# Patient Record
Sex: MALE | Race: WHITE | NOT HISPANIC OR LATINO | Employment: OTHER | URBAN - NONMETROPOLITAN AREA
[De-identification: names, ages, dates, MRNs, and addresses within clinical notes are randomized per-mention and may not be internally consistent; named-entity substitution may affect disease eponyms.]

---

## 2021-08-17 ENCOUNTER — OFFICE VISIT (OUTPATIENT)
Dept: INTERNAL MEDICINE | Facility: CLINIC | Age: 76
End: 2021-08-17

## 2021-08-17 VITALS
BODY MASS INDEX: 24.44 KG/M2 | WEIGHT: 165 LBS | HEART RATE: 85 BPM | TEMPERATURE: 100 F | HEIGHT: 69 IN | SYSTOLIC BLOOD PRESSURE: 120 MMHG | DIASTOLIC BLOOD PRESSURE: 70 MMHG | OXYGEN SATURATION: 97 %

## 2021-08-17 DIAGNOSIS — R06.02 SHORTNESS OF BREATH: ICD-10-CM

## 2021-08-17 DIAGNOSIS — R50.9 FEVER, UNSPECIFIED FEVER CAUSE: Primary | ICD-10-CM

## 2021-08-17 LAB — SARS-COV-2 ORF1AB RESP QL NAA+PROBE: DETECTED

## 2021-08-17 PROCEDURE — 99203 OFFICE O/P NEW LOW 30 MIN: CPT | Performed by: NURSE PRACTITIONER

## 2021-08-17 PROCEDURE — U0005 INFEC AGEN DETEC AMPLI PROBE: HCPCS | Performed by: NURSE PRACTITIONER

## 2021-08-17 PROCEDURE — U0004 COV-19 TEST NON-CDC HGH THRU: HCPCS | Performed by: NURSE PRACTITIONER

## 2021-08-17 RX ORDER — DIVALPROEX SODIUM 500 MG/1
500 TABLET, DELAYED RELEASE ORAL 2 TIMES DAILY
COMMUNITY

## 2021-08-17 RX ORDER — AZITHROMYCIN 250 MG/1
TABLET, FILM COATED ORAL
Qty: 6 TABLET | Refills: 0 | Status: SHIPPED | OUTPATIENT
Start: 2021-08-17

## 2021-08-17 RX ORDER — TAMSULOSIN HYDROCHLORIDE 0.4 MG/1
1 CAPSULE ORAL DAILY
COMMUNITY

## 2021-08-17 NOTE — PROGRESS NOTES
"        Subjective     Chief Complaint   Patient presents with   • Cough   • Generalized Body Aches   • Nasal Congestion       History of Present Illness  Pt comes in today with complaints of cough, aches, and nasal congestion. Temp today was 100. He is vaccinated for COVID. Onset of symptoms is yesterday. No loss of taste or smell.  He has been taking antihistamine. States his eyes are burning this am. He is nervous because of history of radiation form lung cancer.     Review of Systems   Denies any chest pain.  Admits to anxiety about possible diagnosis  Otherwise complete ROS reviewed and negative except as mentioned in the HPI.    Past Medical History:   Past Medical History:   Diagnosis Date   • Cancer (CMS/HCC)     Bladder & Lung     Past Surgical History:  Past Surgical History:   Procedure Laterality Date   • BLADDER SURGERY      Bladder Cancer   • BRAIN SURGERY     • HERNIA REPAIR       Social History:  reports that he has never smoked. He has never used smokeless tobacco. He reports that he does not drink alcohol and does not use drugs.    Family History: family history includes No Known Problems in his brother.      Allergies:  Allergies   Allergen Reactions   • Codeine Hives   • Sulfa Antibiotics Other (See Comments)     Caused Kidneys to crystalize.     Medications:  Prior to Admission medications    Medication Sig Start Date End Date Taking? Authorizing Provider   divalproex (DEPAKOTE) 500 MG DR tablet Take 500 mg by mouth 2 (Two) Times a Day.   Yes ProviderChio MD   tamsulosin (FLOMAX) 0.4 MG capsule 24 hr capsule Take 1 capsule by mouth Daily.   Yes ProviderChio MD       Objective     Vital Signs: /70   Pulse 85   Temp 100 °F (37.8 °C)   Ht 175.3 cm (69\")   Wt 74.8 kg (165 lb)   SpO2 97%   BMI 24.37 kg/m²   Physical Exam  Vitals reviewed.   Constitutional:       Appearance: Normal appearance. He is well-developed. He is ill-appearing.   HENT:      Head: Normocephalic and " atraumatic.      Right Ear: Tympanic membrane normal.      Left Ear: Tympanic membrane normal.      Mouth/Throat:      Pharynx: Posterior oropharyngeal erythema present.   Eyes:      Extraocular Movements: Extraocular movements intact.      Pupils: Pupils are equal, round, and reactive to light.   Neck:      Vascular: No JVD.   Cardiovascular:      Rate and Rhythm: Normal rate and regular rhythm.      Pulses: Normal pulses.   Pulmonary:      Comments: Diminished throughout.   Abdominal:      General: Bowel sounds are normal.      Palpations: Abdomen is soft.   Musculoskeletal:         General: No deformity.      Cervical back: Normal range of motion and neck supple.   Lymphadenopathy:      Cervical: No cervical adenopathy.   Skin:     General: Skin is warm and dry.   Neurological:      Mental Status: He is alert and oriented to person, place, and time.   Psychiatric:         Behavior: Behavior normal.         Thought Content: Thought content normal.         Judgment: Judgment normal.       Results Reviewed:  No results found for: GLUCOSE, BUN, CREATININE, NA, K, CL, CO2, CALCIUM, ALT, AST, WBC, HCT, PLT, CHOL, TRIG, HDL, LDL, LDLHDL, HGBA1C      Assessment / Plan     Assessment/Plan:  Diagnoses and all orders for this visit:    1. Fever, unspecified fever cause (Primary)  -     XR Chest PA & Lateral (In Office)  -     COVID-19,APTIMA PANTHER,PAD IN-HOUSE,NP/OP/NASAL SWAB IN UTM/VTM/SALINE/LIQUID AMIES TRANSPORT MEDIA/NP WASH OR ASPIRATE, 24 HR TAT - Swab, Nasal Cavity  -     azithromycin (Zithromax Z-Jonas) 250 MG tablet; Take 2 tablets the first day, then 1 tablet daily for 4 days.  Dispense: 6 tablet; Refill: 0    2. Shortness of breath  -     COVID-19,APTIMA PANTHER,PAD IN-HOUSE,NP/OP/NASAL SWAB IN UTM/VTM/SALINE/LIQUID AMIES TRANSPORT MEDIA/NP WASH OR ASPIRATE, 24 HR TAT - Swab, Nasal Cavity      Instructed him to quarantine until Covid results are known.   Encouraged fluids.   CXR shows scarring mid lung.     No  follow-ups on file. unless patient needs to be seen sooner or acute issues arise.    I have discussed the patient results/orders and and plan/recommendation with them at today's visit.      Clemencia Goldberg, APRN   08/17/2021

## 2021-08-18 ENCOUNTER — TELEPHONE (OUTPATIENT)
Dept: INTERNAL MEDICINE | Facility: CLINIC | Age: 76
End: 2021-08-18

## 2021-08-18 NOTE — TELEPHONE ENCOUNTER
Called pt and let him know that as soon as [facility] gets everything they need they will call the pt and get him scheduled. Pt voiced understanding.

## 2021-08-18 NOTE — TELEPHONE ENCOUNTER
Returned call to patient to discuss symptoms. He states that his symptoms are not severe, but are overall a little worse. He is concerned and doesn't want to wait thinking he's okay and then end up in the hospital. He mentioned that he had spoken to ELSY Simon last night and they discussed the potential of an infusion for him. He is inquiring about the status of that. I advised that I would consult with Sherry and return a call to him. We also discussed having a family member or friend bring him a pulse oximeter to sara his O2 saturation while at home. He states his fever is controlled with tylenol but he is taking it regularly. I advised if his symptoms were worsening to the point of concern with getting a good breath or chest pain and uncontrolled fever, he should go to the ER. He voiced understanding. I also stated I would consult with a provider in the office today to see if any further guidance is needed and would return a call to him.

## 2021-08-18 NOTE — TELEPHONE ENCOUNTER
Called pt and he states he is going to get infusion today at 12 o'clock at [facility]

## 2021-08-18 NOTE — TELEPHONE ENCOUNTER
Attempted to call pt to discuss plan of care recommenede by Clemencia CHIN. No answer. Left Vm to return call.

## 2021-08-18 NOTE — TELEPHONE ENCOUNTER
Caller: Jarrell Jacobo    Relationship to patient: Self    Best call back number:  [phone]      Patient is needing:  Pt said that his covid symptoms seem to be worsening since yesterday. He is experiencing mild shortness of air with exertion. Worsening cough + still has fever and body aches.     He is asking what the course of treatment will be moving forward.         [facility] [address] [place] [address] [phone] [facility] [phone] FX

## 2021-08-18 NOTE — TELEPHONE ENCOUNTER
I've spoken to [facility]. He is 7th in line. Nicki is faxing the PCR result to [facility] now. [facility] will call the patient. If he has problems the hospitalist will take over.

## 2021-08-18 NOTE — TELEPHONE ENCOUNTER
Called pt and let him know that Clemencia Yusuf CHIN is wanting him to get the Regeneron covid infusion at [facility]. Let him know that shes calling them now and as soon as she calls me back ill let him know something. Pt voiced understanding and said that he wants the infusion and wanted to go somewhere else if [facility] wasn't available.

## 2021-08-19 ENCOUNTER — TELEPHONE (OUTPATIENT)
Dept: INTERNAL MEDICINE | Facility: CLINIC | Age: 76
End: 2021-08-19

## 2021-08-19 NOTE — TELEPHONE ENCOUNTER
Called pt to check on him and he states he is feeling much better. Says symptoms severity has decreased. Told him to call us if he needs anything.

## 2021-08-20 ENCOUNTER — PATIENT ROUNDING (BHMG ONLY) (OUTPATIENT)
Dept: INTERNAL MEDICINE | Facility: CLINIC | Age: 76
End: 2021-08-20

## 2021-08-20 NOTE — PROGRESS NOTES
August 20, 2021    Hello, may I speak with Jarrell Jacobo?    My name is Holly Perrin     I am  with [facility]  [address] [place] [address] [phone].    Before we get started may I verify your date of birth? 1945    I am calling to officially welcome you to our practice and ask about your recent visit. Is this a good time to talk? yes    Tell me about your visit with us. What things went well?  Everything went well - Sherry took good care of me.        We're always looking for ways to make our patients' experiences even better. Do you have recommendations on ways we may improve?  no    Overall were you satisfied with your first visit to our practice? yes       I appreciate you taking the time to speak with me today. Is there anything else I can do for you? Yes - the health department told me to quarantine for 14 days and the nurse at [facility] told me to quarantine for 10 days. Can you tell me which I should listen to? I told him to listen to the health department recommendation. He also asked if after his quarantine period should he come in for a test to make sure his covid was gone. I told him to follow the recommendations from the health department and that after that he should no longer be contagious. I explained to him that you can test positive for covid19 up to 3 months after originally testing positive. Patient understood. .       Thank you, and have a great day.

## 2021-08-25 ENCOUNTER — TELEPHONE (OUTPATIENT)
Dept: INTERNAL MEDICINE | Facility: CLINIC | Age: 76
End: 2021-08-25

## 2021-08-25 NOTE — TELEPHONE ENCOUNTER
Caller: Pantera Jacobo    Relationship: Self    Best call back number: [phone]     What form or medical record are you requesting: COPY OF OFFICE VISIT NOTES AND F/U PROCEDURE FROM 08/17/2021    Who is requesting this form or medical record from you: PANTERA JACOBO    How would you like to receive the form or medical records (pick-up, mail, fax): MAIL    If mail, what is the address: [address] [place] [id number]    Timeframe paperwork needed: ASAP

## 2021-08-27 NOTE — TELEPHONE ENCOUNTER
Called and spoke to patient to discuss questions about copies of his records. I informed him that he is welcome to come to the office at any time to pick them up. He voiced understanding and had no further questions.

## 2021-10-08 ENCOUNTER — OFFICE VISIT (OUTPATIENT)
Dept: INTERNAL MEDICINE | Facility: CLINIC | Age: 76
End: 2021-10-08

## 2021-10-08 VITALS
OXYGEN SATURATION: 99 % | DIASTOLIC BLOOD PRESSURE: 78 MMHG | SYSTOLIC BLOOD PRESSURE: 120 MMHG | TEMPERATURE: 98.2 F | HEIGHT: 69 IN | RESPIRATION RATE: 16 BRPM | WEIGHT: 168 LBS | BODY MASS INDEX: 24.88 KG/M2 | HEART RATE: 76 BPM

## 2021-10-08 DIAGNOSIS — R39.89 BLADDER PAIN: Primary | ICD-10-CM

## 2021-10-08 DIAGNOSIS — R19.7 DIARRHEA, UNSPECIFIED TYPE: ICD-10-CM

## 2021-10-08 LAB
BILIRUB BLD-MCNC: NEGATIVE MG/DL
CLARITY, POC: CLEAR
COLOR UR: YELLOW
EXPIRATION DATE: ABNORMAL
GLUCOSE UR STRIP-MCNC: NEGATIVE MG/DL
KETONES UR QL: NEGATIVE
LEUKOCYTE EST, POC: ABNORMAL
Lab: ABNORMAL
NITRITE UR-MCNC: NEGATIVE MG/ML
PH UR: 7 [PH] (ref 5–8)
PROT UR STRIP-MCNC: NEGATIVE MG/DL
RBC # UR STRIP: ABNORMAL /UL
SP GR UR: 1.03 (ref 1–1.03)
UROBILINOGEN UR QL: NORMAL

## 2021-10-08 PROCEDURE — 99213 OFFICE O/P EST LOW 20 MIN: CPT | Performed by: NURSE PRACTITIONER

## 2021-10-08 PROCEDURE — 81003 URINALYSIS AUTO W/O SCOPE: CPT | Performed by: NURSE PRACTITIONER

## 2021-10-08 RX ORDER — ACETAMINOPHEN 500 MG
500 TABLET ORAL
COMMUNITY

## 2021-10-08 RX ORDER — CIPROFLOXACIN 500 MG/1
500 TABLET, FILM COATED ORAL 2 TIMES DAILY
Qty: 10 TABLET | Refills: 0 | Status: SHIPPED | OUTPATIENT
Start: 2021-10-08

## 2021-10-08 NOTE — PROGRESS NOTES
Subjective     Chief Complaint   Patient presents with   • Difficulty Urinating     different from normal    • colon pain       History of Present Illness  Pt comes in today with complaints of urinary pain. He carries a history of bladder cancer with partial removal of bladder. He has had pain in his bladder since surgery 5 years ago. His remedy is to self cath. He usually self caths 4-5 times per day. States he has noticed over the past 3 days, the catheter is causing more pain and now he is having pain even post cath. In addition, he is having loose stools with a negative colonoscopy about a month ago. Now, he is leaking mucous when he has the bladder spasms. No fevers. No flank pain. No blood with cathing.     Review of Systems   Otherwise complete ROS.    Past Medical History:   Past Medical History:   Diagnosis Date   • Cancer (HCC)     Bladder & Lung     Past Surgical History:  Past Surgical History:   Procedure Laterality Date   • BLADDER SURGERY      Bladder Cancer   • BRAIN SURGERY     • HERNIA REPAIR       Social History:  reports that he has never smoked. He has never used smokeless tobacco. He reports that he does not drink alcohol and does not use drugs.    Family History: family history includes No Known Problems in his brother.       Allergies:  Allergies   Allergen Reactions   • Codeine Hives   • Sulfa Antibiotics Other (See Comments)     Caused Kidneys to crystalize.     Medications:  Prior to Admission medications    Medication Sig Start Date End Date Taking? Authorizing Provider   acetaminophen (TYLENOL) 500 MG tablet Take 500 mg by mouth.   Yes Chio Larios MD   divalproex (DEPAKOTE) 500 MG DR tablet Take 500 mg by mouth 2 (Two) Times a Day.   Yes Chio Larios MD   tamsulosin (FLOMAX) 0.4 MG capsule 24 hr capsule Take 1 capsule by mouth Daily.   Yes Chio Larios MD   azithromycin (Zithromax Z-Jonas) 250 MG tablet Take 2 tablets the first day, then 1 tablet daily  "for 4 days. 8/17/21   Yusuf Clemencia FuentesELSY maza       Objective     Vital Signs: /78 (BP Location: Right arm, Patient Position: Sitting, Cuff Size: Adult)   Pulse 76   Temp 98.2 °F (36.8 °C) (Infrared)   Resp 16   Ht 175.3 cm (69.02\")   Wt 76.2 kg (168 lb)   SpO2 99%   BMI 24.80 kg/m²   Physical Exam  Vitals reviewed.   Constitutional:       Appearance: He is well-developed.   HENT:      Head: Normocephalic and atraumatic.   Eyes:      Pupils: Pupils are equal, round, and reactive to light.   Neck:      Vascular: No JVD.   Cardiovascular:      Rate and Rhythm: Normal rate and regular rhythm.   Pulmonary:      Effort: Pulmonary effort is normal.   Abdominal:      General: Bowel sounds are normal.      Palpations: Abdomen is soft.      Tenderness: There is no abdominal tenderness.   Musculoskeletal:         General: No deformity.      Cervical back: Normal range of motion and neck supple.   Lymphadenopathy:      Cervical: No cervical adenopathy.   Skin:     General: Skin is warm and dry.   Neurological:      Mental Status: He is alert and oriented to person, place, and time.   Psychiatric:         Behavior: Behavior normal.         Thought Content: Thought content normal.         Judgment: Judgment normal.       Results Reviewed:  Creatinine   Date Value Ref Range Status   07/19/2021 1.1 0.5 - 1.5 mg/dL Final     Triglycerides   Date Value Ref Range Status   04/27/2021 85 <=149 mg/dL Final     HDL Cholesterol   Date Value Ref Range Status   04/27/2021 83 >=40 mg/dL Final     Comment:     Levels of HDL cholesterol above 60 mg/dL are considered a negative risk factor for coronary heart disease.   Source: NCEP ATP III Expert report, LELAND 2001; 285(19):2486-97; Circulation, 2002;106:3143.     LDL Cholesterol    Date Value Ref Range Status   04/27/2021 126 1 - 129 mg/dL Final     Comment:     ATP III Classification of LDL Cholesterol          Optimal        (<100)      mg/dL          Near Optimal   " (100-129)   mg/dL          Borderline High(130-159)   mg/dL          High           (160-189)   mg/dL          Very High      (>189)      mg/dL         Assessment / Plan     Assessment/Plan:  Diagnoses and all orders for this visit:    1. Bladder pain (Primary)  -     POCT urinalysis dipstick, automated  -     Urine Culture - Urine, Urine, Catheter In/Out    2. Diarrhea, unspecified type  -     Gastrointestinal Panel, PCR - Stool, Per Rectum; Future    Other orders  -     ciprofloxacin (Cipro) 500 MG tablet; Take 1 tablet by mouth 2 (Two) Times a Day.  Dispense: 10 tablet; Refill: 0      UA here shows moderate leukocytes and trace of blood. Will send for culture and pt to call back Monday with update. If not better, will need CT. He has a CT of the chest scheduled on the 18th at [facility]. If he does not improve, it may be prudent to have the CT done at [facility].       No follow-ups on file. unless patient needs to be seen sooner or acute issues arise.    I have discussed the patient results/orders and and plan/recommendation with them at today's visit.      Clemencia Goldberg, APRN   10/08/2021

## 2021-10-13 ENCOUNTER — TELEPHONE (OUTPATIENT)
Dept: INTERNAL MEDICINE | Facility: CLINIC | Age: 76
End: 2021-10-13

## 2021-10-13 LAB
ADV 40+41 DNA STL QL NAA+NON-PROBE: NOT DETECTED
ASTRO TYP 1-8 RNA STL QL NAA+NON-PROBE: NOT DETECTED
BACTERIA UR CULT: ABNORMAL
BACTERIA UR CULT: ABNORMAL
C CAYETANENSIS DNA STL QL NAA+NON-PROBE: NOT DETECTED
C COLI+JEJ+UPSA DNA STL QL NAA+NON-PROBE: NOT DETECTED
C DIF TOX TCDA+TCDB STL QL NAA+NON-PROBE: NOT DETECTED
CRYPTOSP DNA STL QL NAA+NON-PROBE: NOT DETECTED
E COLI O157 DNA STL QL NAA+NON-PROBE: NORMAL
E HISTOLYT DNA STL QL NAA+NON-PROBE: NOT DETECTED
EAEC PAA PLAS AGGR+AATA ST NAA+NON-PRB: NOT DETECTED
EC STX1+STX2 GENES STL QL NAA+NON-PROBE: NOT DETECTED
EPEC EAE GENE STL QL NAA+NON-PROBE: NOT DETECTED
ETEC LTA+ST1A+ST1B TOX ST NAA+NON-PROBE: NOT DETECTED
G LAMBLIA DNA STL QL NAA+NON-PROBE: NOT DETECTED
NOROVIRUS GI+II RNA STL QL NAA+NON-PROBE: NOT DETECTED
OTHER ANTIBIOTIC SUSC ISLT: ABNORMAL
P SHIGELLOIDES DNA STL QL NAA+NON-PROBE: NOT DETECTED
RVA RNA STL QL NAA+NON-PROBE: NOT DETECTED
S ENT+BONG DNA STL QL NAA+NON-PROBE: NOT DETECTED
SAPO I+II+IV+V RNA STL QL NAA+NON-PROBE: NOT DETECTED
SHIGELLA SP+EIEC IPAH ST NAA+NON-PROBE: NOT DETECTED
V CHOL+PARA+VUL DNA STL QL NAA+NON-PROBE: NOT DETECTED
V CHOLERAE DNA STL QL NAA+NON-PROBE: NOT DETECTED
Y ENTEROCOL DNA STL QL NAA+NON-PROBE: NOT DETECTED

## 2021-10-13 NOTE — TELEPHONE ENCOUNTER
"Called pt with results of labs. Pt voiced understanding. Pt states he is feeling much better and \"back to normal.\"   "

## 2021-11-30 ENCOUNTER — CLINICAL SUPPORT (OUTPATIENT)
Dept: INTERNAL MEDICINE | Facility: CLINIC | Age: 76
End: 2021-11-30

## 2021-11-30 DIAGNOSIS — Z11.52 ENCOUNTER FOR SCREENING FOR COVID-19: Primary | ICD-10-CM

## 2021-11-30 LAB
SARS-COV-2 ORF1AB RESP QL NAA+PROBE: NOT DETECTED
SARS-COV-2 RNA PNL SPEC NAA+PROBE: NOT DETECTED

## 2021-11-30 PROCEDURE — 87635 SARS-COV-2 COVID-19 AMP PRB: CPT | Performed by: NURSE PRACTITIONER

## 2021-11-30 PROCEDURE — U0005 INFEC AGEN DETEC AMPLI PROBE: HCPCS | Performed by: NURSE PRACTITIONER

## 2021-11-30 PROCEDURE — U0004 COV-19 TEST NON-CDC HGH THRU: HCPCS | Performed by: NURSE PRACTITIONER

## 2021-11-30 PROCEDURE — 99211 OFF/OP EST MAY X REQ PHY/QHP: CPT | Performed by: NURSE PRACTITIONER

## 2021-11-30 NOTE — PROGRESS NOTES
Jarrell Jacobo  1945  [id number]    Verified patient's NAME and  before test was performed.     COVID-19 Test Performed:   Nasopharyngeal Swab     Location:  PATIENT VEHICLE     How did the patient tolerate the test?   Well tolerated by patient..    Staff Member Performing Test:  Shira Cheung Ma    PPE Worn:    mask, gloves, eye protection, face shield, gown and respirator

## 2022-07-01 ENCOUNTER — TELEPHONE (OUTPATIENT)
Dept: INTERNAL MEDICINE | Facility: CLINIC | Age: 77
End: 2022-07-01

## 2022-07-01 NOTE — TELEPHONE ENCOUNTER
Caller: Jarrell Jacobo     Relationship: [unfilled]     Best call back number: [phone]    What is your medical concern?  THE PATIENT STATES THAT HE FEELS LIKE HE HAS ACID REFLUX AND THAT SOMETHING IS IRRITATING IS ESOPHAGUS. THE PATIENT ALSO STATES THAT HE HAS TAKEN TUMS, OMEPRAZOLE 20 MG, AND PEPTO- BISMOL AND IS STILL EXPERIENCING SYMPTOMS. THE PATIENT STATES THAT HE WOULD LIKE TO KNOW WHAT TO DO TO FIND RELIEF.  PLEASE ADVISE    How long has this issue been going on?  12 HOURS    Is your provider already aware of this issue? NO    Have you been treated for this issue? NO

## 2022-07-01 NOTE — TELEPHONE ENCOUNTER
I have called patient and he has not taken the omeprazole in 7 months. Patient started back on the omeprazole this morning and will continue to take it along with the Gaviscon. If patient's symptoms are not any better on Tuesday, he will the office and schedule an appt.

## 2023-05-04 ENCOUNTER — OFFICE VISIT (OUTPATIENT)
Dept: INTERNAL MEDICINE | Facility: CLINIC | Age: 78
End: 2023-05-04
Payer: MEDICARE

## 2023-05-04 ENCOUNTER — TELEPHONE (OUTPATIENT)
Dept: INTERNAL MEDICINE | Facility: CLINIC | Age: 78
End: 2023-05-04

## 2023-05-04 VITALS
RESPIRATION RATE: 18 BRPM | WEIGHT: 172.1 LBS | TEMPERATURE: 98 F | OXYGEN SATURATION: 100 % | BODY MASS INDEX: 24.64 KG/M2 | HEIGHT: 70 IN | HEART RATE: 77 BPM

## 2023-05-04 DIAGNOSIS — R11.0 NAUSEA: Primary | ICD-10-CM

## 2023-05-04 DIAGNOSIS — J01.10 ACUTE NON-RECURRENT FRONTAL SINUSITIS: ICD-10-CM

## 2023-05-04 DIAGNOSIS — R05.1 ACUTE COUGH: Primary | ICD-10-CM

## 2023-05-04 LAB
EXPIRATION DATE: NORMAL
FLUAV AG UPPER RESP QL IA.RAPID: NOT DETECTED
FLUBV AG UPPER RESP QL IA.RAPID: NOT DETECTED
INTERNAL CONTROL: NORMAL
Lab: NORMAL
SARS-COV-2 AG UPPER RESP QL IA.RAPID: NOT DETECTED

## 2023-05-04 RX ORDER — AMOXICILLIN AND CLAVULANATE POTASSIUM 500; 125 MG/1; MG/1
1 TABLET, FILM COATED ORAL 2 TIMES DAILY
Qty: 14 TABLET | Refills: 0 | Status: SHIPPED | OUTPATIENT
Start: 2023-05-04 | End: 2023-05-05

## 2023-05-04 RX ORDER — METHYLPREDNISOLONE 4 MG/1
TABLET ORAL
Qty: 21 TABLET | Refills: 0 | Status: SHIPPED | OUTPATIENT
Start: 2023-05-04

## 2023-05-04 RX ORDER — TADALAFIL 10 MG/1
10 TABLET ORAL DAILY PRN
COMMUNITY
Start: 2023-04-29 | End: 2023-05-04

## 2023-05-04 RX ORDER — ONDANSETRON 4 MG/1
4 TABLET, ORALLY DISINTEGRATING ORAL EVERY 8 HOURS PRN
Qty: 30 TABLET | Refills: 0 | Status: SHIPPED | OUTPATIENT
Start: 2023-05-04

## 2023-05-04 RX ORDER — CLOTRIMAZOLE AND BETAMETHASONE DIPROPIONATE 10; .64 MG/G; MG/G
CREAM TOPICAL
COMMUNITY
Start: 2023-03-15 | End: 2023-05-04

## 2023-05-04 NOTE — PROGRESS NOTES
Subjective     Chief Complaint   Patient presents with   • Cough     Symptoms started 3 days ago.    • Nasal Congestion   • Generalized Body Aches       History of Present Illness  Patient presents today with complaints of cough, nasal congestion, and body aches for the last 3 days. Initially thought was allergies, but felt worse yesterday. Admits he is a little better today. Has been taking OTC mucinex. Tested of COVID at home yesterday, was negative. Cough is productive at times, congestion has loosened some.   Patient's PMR from outside medical facility reviewed and noted.    Review of Systems   Constitutional: Positive for fatigue. Negative for activity change, fever and unexpected weight change.   HENT: Positive for congestion, ear pain, postnasal drip, rhinorrhea and sneezing. Negative for mouth sores and trouble swallowing.    Eyes: Negative for discharge and visual disturbance.   Respiratory: Positive for cough. Negative for shortness of breath and wheezing.    Cardiovascular: Negative for chest pain and leg swelling.   Gastrointestinal: Negative for abdominal pain, constipation, diarrhea and nausea.   Genitourinary: Negative for decreased urine volume, difficulty urinating and hematuria.   Musculoskeletal: Positive for myalgias. Negative for back pain and gait problem.   Skin: Negative for color change and rash.   Allergic/Immunologic: Negative for environmental allergies and immunocompromised state.   Neurological: Positive for headaches. Negative for weakness.   Psychiatric/Behavioral: Negative for confusion and sleep disturbance.        Otherwise complete ROS reviewed and negative except as mentioned in the HPI.    Past Medical History:   Past Medical History:   Diagnosis Date   • Cancer     Bladder & Lung     Past Surgical History:  Past Surgical History:   Procedure Laterality Date   • BLADDER SURGERY      Bladder Cancer   • BRAIN SURGERY     • HERNIA REPAIR       Social History:  reports that  "he has never smoked. He has never used smokeless tobacco. He reports that he does not drink alcohol and does not use drugs.    Family History: family history includes Heart disease in his father; No Known Problems in his brother.      Allergies:  Allergies   Allergen Reactions   • Codeine Hives   • Sulfa Antibiotics Other (See Comments)     Caused Kidneys to crystalize.     Medications:  Prior to Admission medications    Medication Sig Start Date End Date Taking? Authorizing Provider   azithromycin (Zithromax Z-Jonas) 250 MG tablet Take 2 tablets the first day, then 1 tablet daily for 4 days. 8/17/21 5/4/23 Yes Clemencia Goldberg APRN   acetaminophen (TYLENOL) 500 MG tablet Take 1 tablet by mouth.    ProviderChio MD   ciprofloxacin (Cipro) 500 MG tablet Take 1 tablet by mouth 2 (Two) Times a Day. 10/8/21   Clemencia Goldberg APRN   clotrimazole-betamethasone (LOTRISONE) 1-0.05 % cream APPLY TOPICALLY 2 TIMES DAILY.  Patient not taking: Reported on 5/4/2023 3/15/23   Chio Larios MD   tadalafil (CIALIS) 10 MG tablet Take 1 tablet by mouth Daily As Needed.  Patient not taking: Reported on 5/4/2023 4/29/23   Chio Larios MD   divalproex (DEPAKOTE) 500 MG DR tablet Take 500 mg by mouth 2 (Two) Times a Day.  5/4/23  Chio Larios MD   tamsulosin (FLOMAX) 0.4 MG capsule 24 hr capsule Take 1 capsule by mouth Daily.  5/4/23  Chio Larios MD       Objective     Vital Signs: Pulse 77   Temp 98 °F (36.7 °C) (Skin)   Resp 18   Ht 177.8 cm (70\")   Wt 78.1 kg (172 lb 1.6 oz)   SpO2 100%   BMI 24.69 kg/m²   Physical Exam  Vitals and nursing note reviewed.   Constitutional:       General: He is not in acute distress.     Appearance: Normal appearance. He is not ill-appearing.   HENT:      Head: Normocephalic and atraumatic.      Right Ear: External ear normal.      Left Ear: External ear normal.      Ears:      Comments: BL TM bulging       Nose: Congestion and rhinorrhea " present.      Mouth/Throat:      Mouth: Mucous membranes are moist.      Pharynx: No posterior oropharyngeal erythema.   Eyes:      General: No scleral icterus.     Extraocular Movements: Extraocular movements intact.      Conjunctiva/sclera: Conjunctivae normal.      Pupils: Pupils are equal, round, and reactive to light.   Cardiovascular:      Rate and Rhythm: Normal rate and regular rhythm.      Pulses: Normal pulses.      Heart sounds: Normal heart sounds.   Pulmonary:      Effort: Pulmonary effort is normal. No respiratory distress.      Breath sounds: Normal breath sounds. No wheezing.   Abdominal:      General: Abdomen is flat. Bowel sounds are normal.      Palpations: Abdomen is soft.      Tenderness: There is no abdominal tenderness.   Musculoskeletal:         General: Normal range of motion.      Cervical back: Normal range of motion.      Right lower leg: No edema.      Left lower leg: No edema.   Skin:     General: Skin is warm and dry.      Capillary Refill: Capillary refill takes less than 2 seconds.      Findings: No erythema or rash.   Neurological:      General: No focal deficit present.      Mental Status: He is alert and oriented to person, place, and time. Mental status is at baseline.      Motor: No weakness.   Psychiatric:         Mood and Affect: Mood normal.         Behavior: Behavior normal.         Thought Content: Thought content normal.         Judgment: Judgment normal.         BMI is within normal parameters. No other follow-up for BMI required.    Results Reviewed:  BUN   Date Value Ref Range Status   05/03/2022 18 8 - 26 mg/dL Final     Creatinine   Date Value Ref Range Status   05/03/2022 0.94 0.72 - 1.25 mg/dL Final   07/19/2021 1.1 0.5 - 1.5 mg/dL Final     Sodium   Date Value Ref Range Status   05/03/2022 141 136 - 145 mmol/L Final     Potassium   Date Value Ref Range Status   05/03/2022 4.3 3.3 - 4.8 mmol/L Final     Comment:     Plasma reference ranges are shown, please note  that serum reference ranges are higher than plasma.     Chloride   Date Value Ref Range Status   05/03/2022 106 98 - 107 mmol/L Final     Total CO2   Date Value Ref Range Status   05/03/2022 27 23 - 31 mmol/L Final     Calcium   Date Value Ref Range Status   05/03/2022 8.9 8.4 - 10.5 mg/dL Final     Triglycerides   Date Value Ref Range Status   03/14/2023 74 <=149 mg/dL Final     HDL Cholesterol   Date Value Ref Range Status   03/14/2023 69 >=40 mg/dL Final     Comment:     Levels of HDL cholesterol above 60 mg/dL are considered a negative risk factor for coronary heart disease.   Source: NCEP ATP III Expert report, LELAND 2001; 285(19):2486-97; Circulation, 2002;106:3143.     LDL Cholesterol    Date Value Ref Range Status   03/14/2023 134 (H) 1 - 129 mg/dL Final     Comment:     ATP III Classification of LDL Cholesterol          Optimal        (<100)      mg/dL          Near Optimal   (100-129)   mg/dL          Borderline High(130-159)   mg/dL          High           (160-189)   mg/dL          Very High      (>189)      mg/dL         Assessment / Plan     Assessment/Plan:  1. Acute cough  - POCT SARS-CoV-2 Antigen REHAN + Flu    2. Acute non-recurrent frontal sinusitis  - amoxicillin-clavulanate (Augmentin) 500-125 MG per tablet; Take 1 tablet by mouth 2 (Two) Times a Day for 7 days.  Dispense: 14 tablet; Refill: 0  - methylPREDNISolone (MEDROL) 4 MG dose pack; Take as directed on package instructions.  Dispense: 21 tablet; Refill: 0    Negative covid/flu. Discussed viral testing, [place]     Return if symptoms worsen or fail to improve. unless patient needs to be seen sooner or acute issues arise.      I have discussed the patient results/orders and and plan/recommendation with them at today's visit.      Ladonna Sheffield, APRN   05/04/2023

## 2023-05-04 NOTE — TELEPHONE ENCOUNTER
Caller: Jarrell Jacobo    Relationship: Self    Best call back number: [phone]    What medication are you requesting:  SOMETHING FOR NAUSEA     What are your current symptoms  NAUSEA     How long have you been experiencing symptoms:  SINCE STARTING THE ANTIBIOTICS THIS MORNING       If a prescription is needed, what is your preferred pharmacy and phone number:    [facility] [address] [place] [address] - [address] [place] [address] [phone] [facility] [phone] FX    Additional notes:  PATIENT IS REQUESTING SOMEONE CALL HIM BACK ONCE SENT SO HE CAN PICK IT UP OR SEND SOMEONE TO

## 2023-05-05 ENCOUNTER — TELEPHONE (OUTPATIENT)
Dept: INTERNAL MEDICINE | Facility: CLINIC | Age: 78
End: 2023-05-05
Payer: MEDICARE

## 2023-05-05 DIAGNOSIS — J01.10 ACUTE NON-RECURRENT FRONTAL SINUSITIS: Primary | ICD-10-CM

## 2023-05-05 RX ORDER — CEFDINIR 300 MG/1
300 CAPSULE ORAL 2 TIMES DAILY
Qty: 14 CAPSULE | Refills: 0 | Status: SHIPPED | OUTPATIENT
Start: 2023-05-05

## 2023-05-05 NOTE — TELEPHONE ENCOUNTER
Caller: Jarrell Jacobo    Relationship: Self    Best call back number:[phone]    What medications are you currently taking:   Current Outpatient Medications on File Prior to Visit   Medication Sig Dispense Refill   • amoxicillin-clavulanate (Augmentin) 500-125 MG per tablet Take 1 tablet by mouth 2 (Two) Times a Day for 7 days. 14 tablet 0   • methylPREDNISolone (MEDROL) 4 MG dose pack Take as directed on package instructions. 21 tablet 0   • ondansetron ODT (ZOFRAN-ODT) 4 MG disintegrating tablet Place 1 tablet on the tongue Every 8 (Eight) Hours As Needed for Nausea or Vomiting. 30 tablet 0     No current facility-administered medications on file prior to visit.      When did you start taking these medications: 05/04/23    Which medication are you concerned about: amoxicillin-clavulanate (Augmentin) 500-125 MG per tablet, methylPREDNISolone (MEDROL) 4 MG dose pack    Who prescribed you this medication: CLARSISA    What are your concerns: TAKING THESE MEDICATIONS ARE MAKING PATIENT NAUSEA, PATIENT CALLED YESTERDAY AND RECEIVED A MEDICATION FOR THE NAUSEA AND ITS NOT HELPING. PATIENT IS NEEDING TO KNOW IF IT IS OKAY FOR HIM TO STOP TAKING THESE MEDICATIONS. PATIENT STATES HIS HEAD FELLS LIKE ITS FLOATING AND HEAVY ALSO.

## 2023-05-05 NOTE — TELEPHONE ENCOUNTER
Attempted to call patient. No answer. Left voicemail informing him of new abx sent in. Advised a returned call with any further questions or concerns.

## 2023-05-05 NOTE — TELEPHONE ENCOUNTER
Caller: Jarrell Jacobo    Relationship: Self    Best call back number: [phone]    What is the best time to reach you: SOON PLEASE    Who are you requesting to speak with (clinical staff, provider,  specific staff member): FANG    Do you know the name of the person who called: FANG    What was the call regarding:  PATIENT RETURNING A MISSED CALL FROM FANG AND IS REQUESTING A CALL BACK TO DISCUSS QUESTIONS HE HAS ABOUT HIS MEDICATIONS    Do you require a callback:  YES

## 2023-11-28 ENCOUNTER — TELEPHONE (OUTPATIENT)
Dept: INTERNAL MEDICINE | Facility: CLINIC | Age: 78
End: 2023-11-28

## 2023-11-28 ENCOUNTER — OFFICE VISIT (OUTPATIENT)
Dept: INTERNAL MEDICINE | Facility: CLINIC | Age: 78
End: 2023-11-28
Payer: MEDICARE

## 2023-11-28 VITALS
OXYGEN SATURATION: 98 % | HEIGHT: 70 IN | SYSTOLIC BLOOD PRESSURE: 121 MMHG | HEART RATE: 73 BPM | BODY MASS INDEX: 22.19 KG/M2 | WEIGHT: 155 LBS | TEMPERATURE: 98 F | DIASTOLIC BLOOD PRESSURE: 76 MMHG

## 2023-11-28 DIAGNOSIS — G50.0 TRIGEMINAL NEURALGIA: Primary | ICD-10-CM

## 2023-11-28 DIAGNOSIS — G50.0 TRIGEMINAL NEURALGIA: ICD-10-CM

## 2023-11-28 PROBLEM — G89.29 CHRONIC LOW BACK PAIN: Status: ACTIVE | Noted: 2023-11-28

## 2023-11-28 PROBLEM — C67.9 ADENOCARCINOMA OF BLADDER: Status: ACTIVE | Noted: 2020-12-10

## 2023-11-28 PROBLEM — K21.9 GASTROESOPHAGEAL REFLUX DISEASE: Status: ACTIVE | Noted: 2023-11-28

## 2023-11-28 PROBLEM — M54.50 CHRONIC LOW BACK PAIN: Status: ACTIVE | Noted: 2023-11-28

## 2023-11-28 PROBLEM — Z86.010 HISTORY OF COLON POLYPS: Status: ACTIVE | Noted: 2021-04-27

## 2023-11-28 PROBLEM — C78.01 MALIGNANT NEOPLASM METASTATIC TO RIGHT LUNG: Status: ACTIVE | Noted: 2020-12-10

## 2023-11-28 PROBLEM — G62.9 NEUROPATHY: Status: ACTIVE | Noted: 2018-08-22

## 2023-11-28 PROBLEM — Z92.3 HISTORY OF EXTERNAL BEAM RADIATION THERAPY: Status: ACTIVE | Noted: 2021-04-12

## 2023-11-28 PROBLEM — Z86.0100 HISTORY OF COLON POLYPS: Status: ACTIVE | Noted: 2021-04-27

## 2023-11-28 RX ORDER — TADALAFIL 10 MG/1
10 TABLET ORAL DAILY PRN
COMMUNITY
Start: 2023-11-24

## 2023-11-28 RX ORDER — LAMOTRIGINE 25 MG/1
25 TABLET ORAL 2 TIMES DAILY
Qty: 60 TABLET | Refills: 2 | Status: SHIPPED | OUTPATIENT
Start: 2023-11-28 | End: 2023-11-28 | Stop reason: SDUPTHER

## 2023-11-28 RX ORDER — LAMOTRIGINE 25 MG/1
25 TABLET ORAL DAILY
Qty: 30 TABLET | Refills: 2 | Status: SHIPPED | OUTPATIENT
Start: 2023-11-28

## 2023-11-28 NOTE — PROGRESS NOTES
"Chief Complaint  Jaw Pain (Has been to dentist, its not tooth related/)    Subjective        Jarrell Jacobo presents to [facility]  Jaw Pain      Jarrell aJcobo is a 77 y.o. male who comes in to establish a new family practice physician.  Patient has been in to see the dentist multiple times over the jaw pain that radiates into his face.  Has had x-rays and been evaluated fully advised that she does not have a dental problem does not have pain over the TMJ joint and states that the pain is burning in nature as well as a dull deep throbbing and aching.  Believe this to represent trigeminal neuralgia with his prior history of beam radiation to the affected areas due to his prior lung cancer diagnosis.  We will go ahead and start him on some lamictal to help with this when it bothers him.  Patient had previously been on Depakote for prior seizure history but is recently stopped taking it and I believe this was helping this pain during that time.    Objective   Vital Signs:  /76 (BP Location: Left arm, Patient Position: Sitting, Cuff Size: Adult)   Pulse 73   Temp 98 °F (36.7 °C) (Infrared)   Ht 177.8 cm (70\")   Wt 70.3 kg (155 lb)   SpO2 98%   BMI 22.24 kg/m²   Estimated body mass index is 22.24 kg/m² as calculated from the following:    Height as of this encounter: 177.8 cm (70\").    Weight as of this encounter: 70.3 kg (155 lb).       BMI is within normal parameters. No other follow-up for BMI required.    Past Medical History:   Diagnosis Date    Cancer     Bladder & Lung       Past Surgical History:   Procedure Laterality Date    BLADDER SURGERY      Bladder Cancer    BRAIN SURGERY      HERNIA REPAIR         Social History     Tobacco Use    Smoking status: Never    Smokeless tobacco: Never   Vaping Use    Vaping Use: Never used   Substance Use Topics    Alcohol use: Never    Drug use: Never       Family History   Problem Relation Age of Onset    Heart disease " Father     No Known Problems Brother        Allergies as of 11/28/2023 - Reviewed 11/28/2023   Allergen Reaction Noted    Codeine Hives 08/17/2021    Sulfa antibiotics Other (See Comments) 08/17/2021         Current Outpatient Medications:     tadalafil (CIALIS) 10 MG tablet, Take 1 tablet by mouth Daily As Needed for Erectile Dysfunction., Disp: , Rfl:     lamoTRIgine (LaMICtal) 25 MG tablet, Take 1 tablet by mouth 2 (Two) Times a Day., Disp: 60 tablet, Rfl: 2      Physical Exam  Vitals and nursing note reviewed.   Constitutional:       General: He is not in acute distress.     Appearance: Normal appearance.   HENT:      Head: Normocephalic.   Eyes:      Extraocular Movements: Extraocular movements intact.      Pupils: Pupils are equal, round, and reactive to light.   Cardiovascular:      Rate and Rhythm: Normal rate and regular rhythm.      Heart sounds: Normal heart sounds. No murmur heard.  Pulmonary:      Effort: Pulmonary effort is normal. No respiratory distress.      Breath sounds: Normal breath sounds. No rhonchi or rales.   Abdominal:      General: Abdomen is flat. Bowel sounds are normal.      Palpations: Abdomen is soft.   Neurological:      General: No focal deficit present.      Mental Status: He is alert.        Result Review :                   Assessment and Plan   Diagnoses and all orders for this visit:    1. Trigeminal neuralgia (Primary)  -     lamoTRIgine (LaMICtal) 25 MG tablet; Take 1 tablet by mouth 2 (Two) Times a Day.  Dispense: 60 tablet; Refill: 2      EMR Dictation/Transcription disclaimer:   Some of this note may be an electronic transcription/translation of spoken language to printed text. The electronic translation of spoken language may permit erroneous, or at times, nonsensical words or phrases to be inadvertently transcribed; Although I have reviewed the note for such errors, some may still exist.          Follow Up   No follow-ups on file.  Patient was given instructions and  counseling regarding his condition or for health maintenance advice. Please see specific information pulled into the AVS if appropriate.

## 2023-11-28 NOTE — TELEPHONE ENCOUNTER
Called pharmacy and because of side effects pharmacy has to verify that this medication is okay for patient to take.

## 2023-11-28 NOTE — TELEPHONE ENCOUNTER
Caller: Jarrell Jacobo    Relationship: Self    Best call back number:     [phone] (Home)       Who are you requesting to speak with (clinical staff, provider,  specific staff member): CLINICAL    What was the call regarding: THE PATIENT STATES THAT HE WENT TO [facility] AND STATES THAT HE WAS NOT ABLE TO GET  A  MEDICATION BECAUSE THE PHARMACY HAD A QUESTION ABOUT THE MEDICATION.

## 2024-03-01 DIAGNOSIS — G50.0 TRIGEMINAL NEURALGIA: ICD-10-CM

## 2024-03-01 RX ORDER — LAMOTRIGINE 25 MG/1
50 TABLET ORAL 2 TIMES DAILY
Qty: 120 TABLET | Refills: 0 | Status: SHIPPED | OUTPATIENT
Start: 2024-03-01

## 2024-03-19 ENCOUNTER — OFFICE VISIT (OUTPATIENT)
Dept: INTERNAL MEDICINE | Facility: CLINIC | Age: 79
End: 2024-03-19
Payer: MEDICARE

## 2024-03-19 VITALS
RESPIRATION RATE: 16 BRPM | HEIGHT: 70 IN | TEMPERATURE: 97.9 F | OXYGEN SATURATION: 97 % | BODY MASS INDEX: 22.62 KG/M2 | DIASTOLIC BLOOD PRESSURE: 74 MMHG | SYSTOLIC BLOOD PRESSURE: 111 MMHG | WEIGHT: 158 LBS | HEART RATE: 73 BPM

## 2024-03-19 DIAGNOSIS — R51.9 NONINTRACTABLE EPISODIC HEADACHE, UNSPECIFIED HEADACHE TYPE: ICD-10-CM

## 2024-03-19 DIAGNOSIS — S03.00XA DISLOCATION OF TEMPOROMANDIBULAR JOINT, INITIAL ENCOUNTER: ICD-10-CM

## 2024-03-19 DIAGNOSIS — G50.0 TRIGEMINAL NEURALGIA: Primary | ICD-10-CM

## 2024-03-19 RX ORDER — PREDNISONE 20 MG/1
TABLET ORAL
Qty: 6 TABLET | Refills: 0 | Status: SHIPPED | OUTPATIENT
Start: 2024-03-19

## 2024-03-19 NOTE — PROGRESS NOTES
"        Subjective     Chief Complaint   Patient presents with    Headache       Headache    Patient presents today for new onset of headaches. This episode has been going on for about 2 months. State he has similar issues for the past. He had AVM surgery in 2012 and ever since then he will have a similar headache but it will only last a couple of days and it will go away. The headache over the last two months has been consistent. States his head feels heavy, his balance is office and he feels like his head is in a \"vice\". The pressure is enough where it feels like he can't do anything, he just has to sit down. Occasional dizziness and lightheadedness. Anything like walking or bouncing he can feel it in his head. He states he can feel his heart beating in his head, it throbs. He is not currently having seizures. The pain gets worse after he eats.     He has two instances where it hurt so bad he thought he was going to pass out. It has woken him up from his sleep.     Saw neurosurgeon at [place], had MRI, MRA, and CT scan of sinuses and they haven't found anything wrong. He has all of these tests done within the last 3 weeks.     Saw Dr. Mac 3-4 months ago, he was having pain in his tooth. He was cleared by a dentist prior to this appointment. They told him they thought he had trigeminal neuralgia.  They started him on lamotrigine. A few weeks ago he was advised to increase it to 4 instead of 3, and he states it helps but it doesn't get rid of them. He thinks it is something to do with that nerve. He forgot to take the pills with him to [place] last week, and the pain was unbearable. He took Lamotrigine when he got home and it made it better, but didn't make it go away. He is able to take ibuprofen and it will mildly help. He initially was taking 2 lamotrigine in the morning and two at night but he felt like it was wearing off by 12 pm. So he started taking one in the morning and one about 4 hrs later and " that seems to help better. The pain is worse on the right side of his jaw.     States his vision has gotten a lot worse over the last year, but it has progressively been getting worse over the years. He states his vision has not changed significantly in the last 3 months. He has cataracts in his left eye, they told him last year that it wasn't bad enough to have surgery yet.     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 He doesn't know the name of the medication.     Review of Systems   Constitutional:  Negative for activity change, appetite change, chills and fever.   HENT:  Negative for hearing loss, nosebleeds, tinnitus and trouble swallowing.    Eyes:  Positive for visual disturbance.   Respiratory:  Negative for cough, chest tightness, shortness of breath and wheezing.    Cardiovascular:  Negative for chest pain, palpitations and leg swelling.   Gastrointestinal:  Negative for abdominal distention, abdominal pain, blood in stool, constipation, diarrhea, nausea and vomiting.   Endocrine: Negative for cold intolerance, heat intolerance, polydipsia, polyphagia and polyuria.   Genitourinary:  Negative for decreased urine volume, difficulty urinating, dysuria, flank pain, frequency and hematuria.   Musculoskeletal:  Negative for arthralgias, joint swelling and myalgias.   Skin:  Negative for rash.   Allergic/Immunologic: Negative for immunocompromised state.   Neurological:  Positive for dizziness, light-headedness and headaches. Negative for syncope and weakness.   Hematological:  Negative for adenopathy. Does not bruise/bleed easily.   Psychiatric/Behavioral:  Positive for sleep disturbance. Negative for confusion. The patient is not nervous/anxious.       Otherwise complete ROS reviewed and negative except as mentioned in the HPI.    Past Medical History:   Past Medical History:   Diagnosis Date    Cancer     Bladder & Lung     Past Surgical  "History:  Past Surgical History:   Procedure Laterality Date    BLADDER SURGERY      Bladder Cancer    BRAIN SURGERY      HERNIA REPAIR       Social History:  reports that he has never smoked. He has never used smokeless tobacco. He reports that he does not drink alcohol and does not use drugs.    Family History: family history includes Heart disease in his father; No Known Problems in his brother.       Allergies:  Allergies   Allergen Reactions    Codeine Hives    Sulfa Antibiotics Other (See Comments)     Caused Kidneys to crystalize.     Medications:  Prior to Admission medications    Medication Sig Start Date End Date Taking? Authorizing Provider   lamoTRIgine (LaMICtal) 25 MG tablet Take 2 tablets by mouth 2 (Two) Times a Day. 3/1/24   Clemencia Goldberg APRN       Objective     Vital Signs: /74   Pulse 73   Temp 97.9 °F (36.6 °C)   Resp 16   Ht 177.8 cm (70\")   Wt 71.7 kg (158 lb)   SpO2 97%   BMI 22.67 kg/m²   Physical Exam  Vitals reviewed.   Constitutional:       Appearance: Normal appearance. He is well-developed and normal weight.   HENT:      Head: Normocephalic and atraumatic.      Jaw: Tenderness present.      Right Ear: Tympanic membrane normal.      Left Ear: Tympanic membrane normal.      Nose: Nose normal.   Eyes:      Extraocular Movements:      Right eye: No nystagmus.      Left eye: No nystagmus.      Pupils: Pupils are equal, round, and reactive to light.   Neck:      Vascular: No JVD.   Cardiovascular:      Rate and Rhythm: Normal rate and regular rhythm.      Heart sounds: Normal heart sounds.   Pulmonary:      Effort: Pulmonary effort is normal.      Breath sounds: Normal breath sounds.   Abdominal:      General: Bowel sounds are normal.      Palpations: Abdomen is soft.   Musculoskeletal:         General: No deformity. Normal range of motion.      Cervical back: Normal range of motion and neck supple.   Lymphadenopathy:      Cervical: No cervical adenopathy.   Skin:     " General: Skin is warm and dry.   Neurological:      Mental Status: He is alert and oriented to person, place, and time.   Psychiatric:         Mood and Affect: Mood normal.         Behavior: Behavior normal.         Thought Content: Thought content normal.         Judgment: Judgment normal.       BMI is within normal parameters. No other follow-up for BMI required.      Results Reviewed:  BUN   Date Value Ref Range Status   05/03/2022 18 8 - 26 mg/dL Final     Creatinine   Date Value Ref Range Status   02/21/2024 1.1 0.5 - 1.5 mg/dL Final     Sodium   Date Value Ref Range Status   05/03/2022 141 136 - 145 mmol/L Final     Potassium   Date Value Ref Range Status   05/03/2022 4.3 3.3 - 4.8 mmol/L Final     Comment:     Plasma reference ranges are shown, please note that serum reference ranges are higher than plasma.     Chloride   Date Value Ref Range Status   05/03/2022 106 98 - 107 mmol/L Final     Total CO2   Date Value Ref Range Status   05/03/2022 27 23 - 31 mmol/L Final     Calcium   Date Value Ref Range Status   05/03/2022 8.9 8.4 - 10.5 mg/dL Final     Triglycerides   Date Value Ref Range Status   03/14/2023 74 <=149 mg/dL Final     HDL Cholesterol   Date Value Ref Range Status   03/14/2023 69 >=40 mg/dL Final     Comment:     Levels of HDL cholesterol above 60 mg/dL are considered a negative risk factor for coronary heart disease.   Source: NCEP ATP III Expert report, LELAND 2001; 285(19):2486-97; Circulation, 2002;106:3143.     LDL Cholesterol    Date Value Ref Range Status   03/14/2023 134 (H) 1 - 129 mg/dL Final     Comment:     ATP III Classification of LDL Cholesterol          Optimal        (<100)      mg/dL          Near Optimal   (100-129)   mg/dL          Borderline High(130-159)   mg/dL          High           (160-189)   mg/dL          Very High      (>189)      mg/dL         Assessment / Plan     Assessment/Plan:  Diagnoses and all orders for this visit:    1. Trigeminal neuralgia (Primary)  -      predniSONE (DELTASONE) 20 MG tablet; 2 PO daily x1, then 1 PO daily x4  Dispense: 6 tablet; Refill: 0  - Continue 25 mg Lamictal four times daily.     2. Dislocation of temporomandibular joint, initial encounter  -     predniSONE (DELTASONE) 20 MG tablet; 2 PO daily x1, then 1 PO daily x4  Dispense: 6 tablet; Refill: 0  - Gave instruction for patient to get a mouth guard and sleep in it.     3. Nonintractable episodic headache, unspecified headache type  -     predniSONE (DELTASONE) 20 MG tablet; 2 PO daily x1, then 1 PO daily x4  Dispense: 6 tablet; Refill: 0    Patient was advised to send me a [employer] message or call the office in a week to let me know how he is doing.     Return for Medicare Wellness. unless patient needs to be seen sooner or acute issues arise.    Code Status: Full.     I have discussed the patient results/orders and and plan/recommendation with them at today's visit.      Signed by:    ELSY Elder Date: 03/19/24  Answers submitted by the patient for this visit:  Primary Reason for Visit (Submitted on 3/18/2024)  What is the primary reason for your visit?: Other  Other (Submitted on 3/18/2024)  Please describe your symptoms.: head pain and pressure  Have you had these symptoms before?: Yes  How long have you been having these symptoms?: Greater than 2 weeks  Please list any medications you are currently taking for this condition.: lamotrigine

## 2024-03-25 ENCOUNTER — TELEPHONE (OUTPATIENT)
Dept: INTERNAL MEDICINE | Facility: CLINIC | Age: 79
End: 2024-03-25
Payer: MEDICARE

## 2024-03-25 DIAGNOSIS — G50.0 TRIGEMINAL NEURALGIA: Primary | ICD-10-CM

## 2024-03-25 DIAGNOSIS — S03.00XA DISLOCATION OF TEMPOROMANDIBULAR JOINT, INITIAL ENCOUNTER: ICD-10-CM

## 2024-03-25 RX ORDER — CYCLOBENZAPRINE HCL 10 MG
10 TABLET ORAL 2 TIMES DAILY PRN
Qty: 45 TABLET | Refills: 0 | Status: SHIPPED | OUTPATIENT
Start: 2024-03-25

## 2024-03-25 NOTE — TELEPHONE ENCOUNTER
Caller: Jarrell Jacobo    Relationship: Self    Best call back number: [phone]        Who are you requesting to speak with (clinical staff, provider,  specific staff member): LEXA    Do you know the name of the person who called: PATIENT    What was the call regarding: REGARDING VISIT LAST TUES

## 2024-03-25 NOTE — TELEPHONE ENCOUNTER
Patient stated that you told him to call after steroid. Felt better, but Sunday afternoon head started hurting again and was equal to worse day since this has started. Head is still hurting. Has been taking ibuprofen. Does not think the Lamictal is working, he is wondering if there is something else he can try.

## 2024-03-26 ENCOUNTER — PRIOR AUTHORIZATION (OUTPATIENT)
Dept: INTERNAL MEDICINE | Facility: CLINIC | Age: 79
End: 2024-03-26
Payer: MEDICARE

## 2024-03-26 NOTE — TELEPHONE ENCOUNTER
Jarrell Jacobo (Key: [id number])  [place] Case ID #: [id number]  Rx #: [id number]  Drug  Cyclobenzaprine HCl 10MG tablets    Outcome  Approved today  Approved. This drug has been approved under the Member's Medicare Part D benefit. Approved quantity: 45 units per 22 day(s). You may fill up to a 90 day supply except for those on Specialty Tier 5, which can be filled up to a 30 day supply. Please call the pharmacy to process the prescription claim.

## 2024-04-02 ENCOUNTER — TELEPHONE (OUTPATIENT)
Dept: INTERNAL MEDICINE | Facility: CLINIC | Age: 79
End: 2024-04-02
Payer: MEDICARE

## 2024-04-02 DIAGNOSIS — G50.0 TRIGEMINAL NEURALGIA: ICD-10-CM

## 2024-04-02 RX ORDER — LAMOTRIGINE 25 MG/1
50 TABLET ORAL 2 TIMES DAILY
Qty: 120 TABLET | Refills: 0 | Status: SHIPPED | OUTPATIENT
Start: 2024-04-02

## 2024-04-02 NOTE — TELEPHONE ENCOUNTER
Rx Refill Note  Requested Prescriptions     Pending Prescriptions Disp Refills    lamoTRIgine (LaMICtal) 25 MG tablet [Pharmacy Med Name: LAMOTRIGINE 25 MG TABLET] 120 tablet 0     Sig: TAKE 2 TABLETS BY MOUTH TWICE A DAY      Last office visit with prescribing clinician: 3/19/2024   Last telemedicine visit with prescribing clinician: Visit date not found   Next office visit with prescribing clinician: 4/8/2024                         Would you like a call back once the refill request has been completed: [] Yes [] No    If the office needs to give you a call back, can they leave a voicemail: [] Yes [] No    Nicki Barrera RN  04/02/24, 11:01 CDT

## 2024-04-02 NOTE — TELEPHONE ENCOUNTER
Caller: Jarrell Jacobo    Relationship: Self    Best call back number: [phone]     What is the best time to reach you: ANY    Who are you requesting to speak with (clinical staff, provider,  specific staff member): NONE SPECIFIED     What was the call regarding:     PATIENT IS WONDERING IF THE OFFICE HAS RSV VACCINES?    Is it okay if the provider responds through MyChart: NO

## 2024-04-08 ENCOUNTER — OFFICE VISIT (OUTPATIENT)
Dept: INTERNAL MEDICINE | Facility: CLINIC | Age: 79
End: 2024-04-08
Payer: MEDICARE

## 2024-04-08 VITALS
WEIGHT: 157 LBS | HEART RATE: 80 BPM | SYSTOLIC BLOOD PRESSURE: 98 MMHG | DIASTOLIC BLOOD PRESSURE: 64 MMHG | OXYGEN SATURATION: 97 % | HEIGHT: 70 IN | TEMPERATURE: 98.2 F | RESPIRATION RATE: 17 BRPM | BODY MASS INDEX: 22.48 KG/M2

## 2024-04-08 DIAGNOSIS — E55.9 VITAMIN D DEFICIENCY: ICD-10-CM

## 2024-04-08 DIAGNOSIS — Z00.00 MEDICARE ANNUAL WELLNESS VISIT, SUBSEQUENT: Primary | ICD-10-CM

## 2024-04-08 DIAGNOSIS — Z11.59 NEED FOR HEPATITIS C SCREENING TEST: ICD-10-CM

## 2024-04-08 DIAGNOSIS — E78.41 ELEVATED LIPOPROTEIN(A): ICD-10-CM

## 2024-04-08 DIAGNOSIS — R42 DIZZINESS: ICD-10-CM

## 2024-04-08 PROCEDURE — 1159F MED LIST DOCD IN RCRD: CPT | Performed by: NURSE PRACTITIONER

## 2024-04-08 PROCEDURE — G0439 PPPS, SUBSEQ VISIT: HCPCS | Performed by: NURSE PRACTITIONER

## 2024-04-08 PROCEDURE — 1170F FXNL STATUS ASSESSED: CPT | Performed by: NURSE PRACTITIONER

## 2024-04-08 PROCEDURE — 1160F RVW MEDS BY RX/DR IN RCRD: CPT | Performed by: NURSE PRACTITIONER

## 2024-04-08 PROCEDURE — 99213 OFFICE O/P EST LOW 20 MIN: CPT | Performed by: NURSE PRACTITIONER

## 2024-04-08 NOTE — PROGRESS NOTES
The ABCs of the Annual Wellness Visit  Subsequent Medicare Wellness Visit    Subjective    Jarrell Jacobo is a 78 y.o. male who presents for a Subsequent Medicare Wellness Visit.    The following portions of the patient's history were reviewed and   updated as appropriate: allergies, current medications, past family history, past medical history, past social history, past surgical history, and problem list.    Compared to one year ago, the patient feels his physical   health is the same.    Compared to one year ago, the patient feels his mental   health is the same.    Recent Hospitalizations:  He was not admitted to the hospital during the last year.       Current Medical Providers:  Patient Care Team:  Clemencia Goldberg APRN as PCP - General (Nurse Practitioner)    Outpatient Medications Prior to Visit   Medication Sig Dispense Refill    lamoTRIgine (LaMICtal) 25 MG tablet TAKE 2 TABLETS BY MOUTH TWICE A  tablet 0    cyclobenzaprine (FLEXERIL) 10 MG tablet Take 1 tablet by mouth 2 (Two) Times a Day As Needed for Muscle Spasms. 45 tablet 0    predniSONE (DELTASONE) 20 MG tablet 2 PO daily x1, then 1 PO daily x4 6 tablet 0     No facility-administered medications prior to visit.       No opioid medication identified on active medication list. I have reviewed chart for other potential  high risk medication/s and harmful drug interactions in the elderly.        Aspirin is not on active medication list.  Aspirin use is not indicated based on review of current medical condition/s. Risk of harm outweighs potential benefits.  .    Patient Active Problem List   Diagnosis    Adenocarcinoma of bladder    AVM (arteriovenous malformation) brain    Chronic low back pain    Gastroesophageal reflux disease    History of colon polyps    Seizure disorder    Neuropathy    History of external beam radiation therapy    Malignant neoplasm metastatic to right lung     Advance Care Planning   Advance Care Planning    "  Advance Directive is not on file.  ACP discussion was held with the patient during this visit. Patient does not have an advance directive, information provided.     Objective    Vitals:    24 0727   BP: 98/64   Pulse: 80   Resp: 17   Temp: 98.2 °F (36.8 °C)   SpO2: 97%   Weight: 71.2 kg (157 lb)   Height: 177.8 cm (70\")     Estimated body mass index is 22.53 kg/m² as calculated from the following:    Height as of this encounter: 177.8 cm (70\").    Weight as of this encounter: 71.2 kg (157 lb).    BMI is within normal parameters. No other follow-up for BMI required.      Does the patient have evidence of cognitive impairment? No          HEALTH RISK ASSESSMENT    Smoking Status:  Social History     Tobacco Use   Smoking Status Never   Smokeless Tobacco Never     Alcohol Consumption:  Social History     Substance and Sexual Activity   Alcohol Use Not Currently    Alcohol/week: 2.0 standard drinks of alcohol    Types: 2 Glasses of wine per week     Fall Risk Screen:    GENNY Fall Risk Assessment was completed, and patient is at LOW risk for falls.Assessment completed on:2024    Depression Screenin/8/2024     7:25 AM   PHQ-2/PHQ-9 Depression Screening   Little Interest or Pleasure in Doing Things 0-->not at all   Feeling Down, Depressed or Hopeless 0-->not at all   PHQ-9: Brief Depression Severity Measure Score 0       Health Habits and Functional and Cognitive Screenin/8/2024     7:28 AM   Functional & Cognitive Status   Do you have difficulty preparing food and eating? No   Do you have difficulty bathing yourself, getting dressed or grooming yourself? No   Do you have difficulty using the toilet? No   Do you have difficulty moving around from place to place? No   Do you have trouble with steps or getting out of a bed or a chair? No   Current Diet Other   Dental Exam Not up to date   Eye Exam Up to date   Exercise (times per week) 2 times per week   Current Exercises Include Yard " Work;Walking   Do you need help using the phone?  No   Are you deaf or do you have serious difficulty hearing?  No   Do you need help to go to places out of walking distance? No   Do you need help shopping? No   Do you need help preparing meals?  No   Do you need help with housework?  No   Do you need help with laundry? No   Do you need help taking your medications? No   Do you need help managing money? No   Do you ever drive or ride in a car without wearing a seat belt? No   Have you felt unusual stress, anger or loneliness in the last month? No   Who do you live with? Spouse   If you need help, do you have trouble finding someone available to you? No   Have you been bothered in the last four weeks by sexual problems? No       Age-appropriate Screening Schedule:  Refer to the list below for future screening recommendations based on patient's age, sex and/or medical conditions. Orders for these recommended tests are listed in the plan section. The patient has been provided with a written plan.    Health Maintenance   Topic Date Due    HEPATITIS C SCREENING  Never done    LIPID PANEL  03/14/2024    COVID-19 Vaccine (6 - 2023-24 season) 06/28/2024 (Originally 9/1/2023)    ZOSTER VACCINE (1 of 2) 11/28/2024 (Originally 12/6/1995)    INFLUENZA VACCINE  08/01/2024    ANNUAL WELLNESS VISIT  04/08/2025    TDAP/TD VACCINES (2 - Tdap) 10/20/2032    RSV Vaccine - Adults  Completed    Pneumococcal Vaccine 65+  Completed    COLORECTAL CANCER SCREENING  Discontinued                  CMS Preventative Services Quick Reference  Risk Factors Identified During Encounter  None Identified  The above risks/problems have been discussed with the patient.  Pertinent information has been shared with the patient in the After Visit Summary.  An After Visit Summary and PPPS were made available to the patient.    Follow Up:   Next Medicare Wellness visit to be scheduled in 1 year.       Additional E&M Note during same encounter  "follows:  Patient has multiple medical problems which are significant and separately identifiable that require additional work above and beyond the Medicare Wellness Visit.      Chief Complaint  Medicare Wellness-subsequent    Subjective        HPI  Jarrell Jacobo is also being seen today for dizziness-seeing vestibular audiologist on Thursday at [facility]. He is not sure steroids helped. If tooth hurts, will take lamotrigine. States his dizziness comes and goes.        Objective   Vital Signs:  BP 98/64   Pulse 80   Temp 98.2 °F (36.8 °C)   Resp 17   Ht 177.8 cm (70\")   Wt 71.2 kg (157 lb)   SpO2 97%   BMI 22.53 kg/m²     Physical Exam  Vitals reviewed.   Constitutional:       Appearance: Normal appearance. He is well-developed.   HENT:      Head: Normocephalic and atraumatic.   Eyes:      Pupils: Pupils are equal, round, and reactive to light.   Neck:      Vascular: No JVD.   Cardiovascular:      Rate and Rhythm: Normal rate and regular rhythm.   Pulmonary:      Effort: Pulmonary effort is normal.   Abdominal:      General: Bowel sounds are normal.      Palpations: Abdomen is soft.   Musculoskeletal:         General: No deformity.      Cervical back: Normal range of motion and neck supple.   Lymphadenopathy:      Cervical: No cervical adenopathy.   Skin:     General: Skin is warm and dry.   Neurological:      Mental Status: He is alert and oriented to person, place, and time.   Psychiatric:         Behavior: Behavior normal.         Thought Content: Thought content normal.         Judgment: Judgment normal.          Assessment and Plan   Diagnoses and all orders for this visit:    1. Medicare annual wellness visit, subsequent (Primary)  -     CBC & Differential  -     Comprehensive Metabolic Panel    2. Need for hepatitis C screening test  -     Hepatitis C Antibody    3. Vitamin D deficiency  -     Vitamin D,25-Hydroxy    4. Elevated lipoprotein(a)  -     Lipid Panel         Follow Up   Return in about 6 " months (around 10/8/2024).  Patient was given instructions and counseling regarding his condition or for health maintenance advice. Please see specific information pulled into the AVS if appropriate.

## 2024-04-09 LAB
25(OH)D3+25(OH)D2 SERPL-MCNC: 69.1 NG/ML (ref 30–100)
ALBUMIN SERPL-MCNC: 4.4 G/DL (ref 3.8–4.8)
ALBUMIN/GLOB SERPL: 1.8 {RATIO} (ref 1.2–2.2)
ALP SERPL-CCNC: 68 IU/L (ref 44–121)
ALT SERPL-CCNC: 14 IU/L (ref 0–44)
AST SERPL-CCNC: 22 IU/L (ref 0–40)
BASOPHILS # BLD AUTO: 0.1 X10E3/UL (ref 0–0.2)
BASOPHILS NFR BLD AUTO: 1 %
BILIRUB SERPL-MCNC: 0.6 MG/DL (ref 0–1.2)
BUN SERPL-MCNC: 22 MG/DL (ref 8–27)
BUN/CREAT SERPL: 22 (ref 10–24)
CALCIUM SERPL-MCNC: 9.4 MG/DL (ref 8.6–10.2)
CHLORIDE SERPL-SCNC: 101 MMOL/L (ref 96–106)
CHOLEST SERPL-MCNC: 247 MG/DL (ref 100–199)
CO2 SERPL-SCNC: 24 MMOL/L (ref 20–29)
CREAT SERPL-MCNC: 1 MG/DL (ref 0.76–1.27)
EGFRCR SERPLBLD CKD-EPI 2021: 77 ML/MIN/1.73
EOSINOPHIL # BLD AUTO: 0.2 X10E3/UL (ref 0–0.4)
EOSINOPHIL NFR BLD AUTO: 3 %
ERYTHROCYTE [DISTWIDTH] IN BLOOD BY AUTOMATED COUNT: 12.8 % (ref 11.6–15.4)
GLOBULIN SER CALC-MCNC: 2.5 G/DL (ref 1.5–4.5)
GLUCOSE SERPL-MCNC: 95 MG/DL (ref 70–99)
HCT VFR BLD AUTO: 44.4 % (ref 37.5–51)
HCV IGG SERPL QL IA: NON REACTIVE
HDLC SERPL-MCNC: 77 MG/DL
HGB BLD-MCNC: 14.7 G/DL (ref 13–17.7)
IMM GRANULOCYTES # BLD AUTO: 0 X10E3/UL (ref 0–0.1)
IMM GRANULOCYTES NFR BLD AUTO: 0 %
LDLC SERPL CALC-MCNC: 154 MG/DL (ref 0–99)
LYMPHOCYTES # BLD AUTO: 1.3 X10E3/UL (ref 0.7–3.1)
LYMPHOCYTES NFR BLD AUTO: 23 %
MCH RBC QN AUTO: 31.6 PG (ref 26.6–33)
MCHC RBC AUTO-ENTMCNC: 33.1 G/DL (ref 31.5–35.7)
MCV RBC AUTO: 96 FL (ref 79–97)
MONOCYTES # BLD AUTO: 0.7 X10E3/UL (ref 0.1–0.9)
MONOCYTES NFR BLD AUTO: 12 %
NEUTROPHILS # BLD AUTO: 3.5 X10E3/UL (ref 1.4–7)
NEUTROPHILS NFR BLD AUTO: 61 %
PLATELET # BLD AUTO: 246 X10E3/UL (ref 150–450)
POTASSIUM SERPL-SCNC: 4.9 MMOL/L (ref 3.5–5.2)
PROT SERPL-MCNC: 6.9 G/DL (ref 6–8.5)
RBC # BLD AUTO: 4.65 X10E6/UL (ref 4.14–5.8)
SODIUM SERPL-SCNC: 139 MMOL/L (ref 134–144)
TRIGL SERPL-MCNC: 92 MG/DL (ref 0–149)
VLDLC SERPL CALC-MCNC: 16 MG/DL (ref 5–40)
WBC # BLD AUTO: 5.8 X10E3/UL (ref 3.4–10.8)

## 2024-05-11 DIAGNOSIS — G50.0 TRIGEMINAL NEURALGIA: ICD-10-CM

## 2024-05-13 RX ORDER — LAMOTRIGINE 25 MG/1
50 TABLET ORAL 2 TIMES DAILY
Qty: 120 TABLET | Refills: 0 | Status: SHIPPED | OUTPATIENT
Start: 2024-05-13

## 2024-05-13 NOTE — TELEPHONE ENCOUNTER
Rx Refill Note  Requested Prescriptions     Pending Prescriptions Disp Refills    lamoTRIgine (LaMICtal) 25 MG tablet [Pharmacy Med Name: LAMOTRIGINE 25 MG TABLET] 120 tablet 0     Sig: TAKE 2 TABLETS BY MOUTH TWICE A DAY      Last office visit with prescribing clinician: 4/8/2024   Last telemedicine visit with prescribing clinician: Visit date not found   Next office visit with prescribing clinician: 10/7/2024                         Would you like a call back once the refill request has been completed: [] Yes [] No    If the office needs to give you a call back, can they leave a voicemail: [] Yes [] No    Nicki Barrera RN  05/13/24, 07:35 CDT

## 2024-06-10 DIAGNOSIS — G50.0 TRIGEMINAL NEURALGIA: ICD-10-CM

## 2024-06-10 RX ORDER — LAMOTRIGINE 25 MG/1
50 TABLET ORAL 2 TIMES DAILY
Qty: 120 TABLET | Refills: 0 | Status: SHIPPED | OUTPATIENT
Start: 2024-06-10 | End: 2024-06-12 | Stop reason: SDUPTHER

## 2024-06-10 NOTE — TELEPHONE ENCOUNTER
Caller: Jarrell Jacobo    Relationship: Self    Best call back number: [phone]    Requested Prescriptions:   Requested Prescriptions     Pending Prescriptions Disp Refills    lamoTRIgine (LaMICtal) 25 MG tablet 120 tablet 0     Sig: Take 2 tablets by mouth 2 (Two) Times a Day.        Pharmacy where request should be sent: [facility]/PHARMACY #[id number] - [place] - [address] [phone] [facility] [phone]      Last office visit with prescribing clinician: 4/8/2024   Last telemedicine visit with prescribing clinician: Visit date not found   Next office visit with prescribing clinician: 10/7/2024     Additional details provided by patient: PATIENT STATED HE HAS ABOUT A WEEK LEFT OF THIS MEDICATION.    PATIENT IS WANTING TO KNOW IF HE CAN GET A 90 DAY SUPPLY OF THIS MEDICATION BECAUSE HE WILL GOING OUT OF THE COUNTRY AT THE END OF JUNE AND WILL BE GONE UNTIL THE MIDDLE OF SEPTEMBER.    PLEASE CALL TO ADVISE.    Does the patient have less than a 3 day supply:  [] Yes  [x] No      Dinorah Arenas Rep   06/10/24 15:24 CDT

## 2024-06-10 NOTE — TELEPHONE ENCOUNTER
Rx Refill Note  Requested Prescriptions     Pending Prescriptions Disp Refills    lamoTRIgine (LaMICtal) 25 MG tablet 120 tablet 0     Sig: Take 2 tablets by mouth 2 (Two) Times a Day.      Last office visit with prescribing clinician: 4/8/2024   Last telemedicine visit with prescribing clinician: Visit date not found   Next office visit with prescribing clinician: 10/7/2024                         Would you like a call back once the refill request has been completed: [] Yes [] No    If the office needs to give you a call back, can they leave a voicemail: [] Yes [] No    Nicki Barrera RN  06/10/24, 15:58 CDT

## 2024-06-11 ENCOUNTER — TELEPHONE (OUTPATIENT)
Dept: INTERNAL MEDICINE | Facility: CLINIC | Age: 79
End: 2024-06-11
Payer: MEDICARE

## 2024-06-11 NOTE — TELEPHONE ENCOUNTER
Called pt to verify what was going on. He is going out of the country and wanted to know if he could get his Lamotrigine 25 Mg 4x a day with a 90 day supply. He tried to explain that to the hub, and there was some miscommunication. He will be leaving the country the first of July.     Please advise

## 2024-06-11 NOTE — TELEPHONE ENCOUNTER
Caller: Jarrell Jacobo    Relationship: Self    Best call back number: [phone]     Who are you requesting to speak with (clinical staff, provider,  specific staff member): CLINICAL STAFF    What was the call regarding: PATIENT REQUESTING CALLBACK. DID NOT DISCLOSE REASON FOR CALL.

## 2024-06-12 DIAGNOSIS — G50.0 TRIGEMINAL NEURALGIA: ICD-10-CM

## 2024-06-12 RX ORDER — LAMOTRIGINE 25 MG/1
50 TABLET ORAL 2 TIMES DAILY
Qty: 360 TABLET | Refills: 1 | Status: SHIPPED | OUTPATIENT
Start: 2024-06-12

## 2024-06-17 ENCOUNTER — TELEPHONE (OUTPATIENT)
Dept: INTERNAL MEDICINE | Facility: CLINIC | Age: 79
End: 2024-06-17
Payer: MEDICARE

## 2024-06-17 NOTE — TELEPHONE ENCOUNTER
Caller: Jarrell Jacobo    Relationship: Self    Best call back number: [phone]     What is the best time to reach you: ANYTIME    Who are you requesting to speak with (clinical staff, provider,  specific staff member): CLINICAL    What was the call regarding: PATIENT STATES HE NEED A  CALL BACK REGARDING QUESTIONS ABOUT MEDICATION. lamoTRIgine (LaMICtal) 25 MG tablet

## 2024-10-11 ENCOUNTER — OFFICE VISIT (OUTPATIENT)
Dept: INTERNAL MEDICINE | Facility: CLINIC | Age: 79
End: 2024-10-11
Payer: MEDICARE

## 2024-10-11 VITALS
TEMPERATURE: 97.5 F | SYSTOLIC BLOOD PRESSURE: 137 MMHG | DIASTOLIC BLOOD PRESSURE: 77 MMHG | HEART RATE: 78 BPM | BODY MASS INDEX: 22.48 KG/M2 | OXYGEN SATURATION: 100 % | RESPIRATION RATE: 19 BRPM | WEIGHT: 157 LBS | HEIGHT: 70 IN

## 2024-10-11 DIAGNOSIS — R51.9 ACUTE INTRACTABLE HEADACHE, UNSPECIFIED HEADACHE TYPE: ICD-10-CM

## 2024-10-11 DIAGNOSIS — R05.1 ACUTE COUGH: Primary | ICD-10-CM

## 2024-10-11 DIAGNOSIS — J06.9 URI WITH COUGH AND CONGESTION: ICD-10-CM

## 2024-10-11 PROCEDURE — 87428 SARSCOV & INF VIR A&B AG IA: CPT

## 2024-10-11 PROCEDURE — 1160F RVW MEDS BY RX/DR IN RCRD: CPT

## 2024-10-11 PROCEDURE — 1159F MED LIST DOCD IN RCRD: CPT

## 2024-10-11 PROCEDURE — 99213 OFFICE O/P EST LOW 20 MIN: CPT

## 2024-10-11 RX ORDER — CEFDINIR 300 MG/1
300 CAPSULE ORAL 2 TIMES DAILY
Qty: 14 CAPSULE | Refills: 0 | Status: SHIPPED | OUTPATIENT
Start: 2024-10-11 | End: 2024-10-18

## 2024-10-11 RX ORDER — BENZONATATE 100 MG/1
100 CAPSULE ORAL 3 TIMES DAILY PRN
Qty: 60 CAPSULE | Refills: 0 | Status: SHIPPED | OUTPATIENT
Start: 2024-10-11

## 2024-10-11 NOTE — PROGRESS NOTES
"        Subjective     Chief Complaint   Patient presents with   • Cough   • Nasal Congestion   • Headache       Cough  Associated symptoms include headaches, myalgias and rhinorrhea. Pertinent negatives include no fever, shortness of breath or wheezing.   Headache    History of Present Illness  Patient presents today with complains of cough, nasal congestion, headache, and body aches. Denies any fevers. Sniffles started a few days ago, however, remaining symptoms started late yesterday afternoon. Denies any known exposure. Denies any shortness of breath.  Is coughing up \" a little colored phlegm\"  Patient's PMR from outside medical facility reviewed and noted.    Review of Systems   Constitutional:  Negative for fever.   HENT:  Positive for congestion and rhinorrhea.    Respiratory:  Positive for cough. Negative for shortness of breath and wheezing.    Musculoskeletal:  Positive for myalgias.   Neurological:  Positive for headaches.        Otherwise complete ROS reviewed and negative except as mentioned in the HPI.    Past Medical History:   Past Medical History:   Diagnosis Date   • Allergic 1950    Sulfa drug   • Cancer     Bladder & Lung   • Headache    • HL (hearing loss)      Past Surgical History:  Past Surgical History:   Procedure Laterality Date   • ADENOIDECTOMY  2016   • BLADDER SURGERY      Bladder Cancer   • BRAIN SURGERY     • HERNIA REPAIR       Social History:  reports that he has never smoked. He has never used smokeless tobacco. He reports that he does not currently use alcohol after a past usage of about 2.0 standard drinks of alcohol per week. He reports that he does not use drugs.    Family History: family history includes Heart disease in his father; No Known Problems in his brother.      Allergies:  Allergies   Allergen Reactions   • Codeine Hives   • Sulfa Antibiotics Other (See Comments)     Caused Kidneys to crystalize.     Medications:  Prior to Admission medications    Medication Sig " "Start Date End Date Taking? Authorizing Provider   lamoTRIgine (LaMICtal) 25 MG tablet Take 2 tablets by mouth 2 (Two) Times a Day. 6/12/24   Clemencia Goldberg APRN       JACKSON:        PHQ-9 Depression Screening  Little interest or pleasure in doing things?     Feeling down, depressed, or hopeless?     Trouble falling or staying asleep, or sleeping too much?     Feeling tired or having little energy?     Poor appetite or overeating?     Feeling bad about yourself - or that you are a failure or have let yourself or your family down?     Trouble concentrating on things, such as reading the newspaper or watching television?     Moving or speaking so slowly that other people could have noticed? Or the opposite - being so fidgety or restless that you have been moving around a lot more than usual?     Thoughts that you would be better off dead, or of hurting yourself in some way?     PHQ-9 Total Score     If you checked off any problems, how difficult have these problems made it for you to do your work, take care of things at home, or get along with other people?           Objective     Vital Signs: /77   Pulse 78   Temp 97.5 °F (36.4 °C)   Resp 19   Ht 177.8 cm (70\")   Wt 71.2 kg (157 lb)   SpO2 100%   BMI 22.53 kg/m²   Physical Exam  Vitals and nursing note reviewed.   Constitutional:       Appearance: Normal appearance.   HENT:      Head: Normocephalic.      Right Ear: External ear normal.      Left Ear: External ear normal.      Nose: Congestion and rhinorrhea present.      Mouth/Throat:      Mouth: Mucous membranes are moist.      Pharynx: No posterior oropharyngeal erythema.   Cardiovascular:      Rate and Rhythm: Normal rate and regular rhythm.      Pulses: Normal pulses.      Heart sounds: Normal heart sounds.   Pulmonary:      Effort: Pulmonary effort is normal.      Breath sounds: Normal breath sounds.   Neurological:      General: No focal deficit present.      Mental Status: He is alert and " oriented to person, place, and time.   Psychiatric:         Mood and Affect: Mood normal.         Behavior: Behavior normal.       BMI is within normal parameters. No other follow-up for BMI required.      Advance Care Planning   ACP discussion was held with the patient during this visit.         Results Reviewed:  Glucose   Date Value Ref Range Status   04/08/2024 95 70 - 99 mg/dL Final     BUN   Date Value Ref Range Status   04/08/2024 22 8 - 27 mg/dL Final   05/03/2022 18 8 - 26 mg/dL Final     Creatinine   Date Value Ref Range Status   04/08/2024 1.00 0.76 - 1.27 mg/dL Final   02/21/2024 1.1 0.5 - 1.5 mg/dL Final     Sodium   Date Value Ref Range Status   04/08/2024 139 134 - 144 mmol/L Final   05/03/2022 141 136 - 145 mmol/L Final     Potassium   Date Value Ref Range Status   04/08/2024 4.9 3.5 - 5.2 mmol/L Final   05/03/2022 4.3 3.3 - 4.8 mmol/L Final     Comment:     Plasma reference ranges are shown, please note that serum reference ranges are higher than plasma.     Chloride   Date Value Ref Range Status   04/08/2024 101 96 - 106 mmol/L Final   05/03/2022 106 98 - 107 mmol/L Final     Total CO2   Date Value Ref Range Status   04/08/2024 24 20 - 29 mmol/L Final   05/03/2022 27 23 - 31 mmol/L Final     Calcium   Date Value Ref Range Status   04/08/2024 9.4 8.6 - 10.2 mg/dL Final   05/03/2022 8.9 8.4 - 10.5 mg/dL Final     ALT (SGPT)   Date Value Ref Range Status   04/08/2024 14 0 - 44 IU/L Final     AST (SGOT)   Date Value Ref Range Status   04/08/2024 22 0 - 40 IU/L Final     WBC   Date Value Ref Range Status   04/08/2024 5.8 3.4 - 10.8 x10E3/uL Final     Hematocrit   Date Value Ref Range Status   04/08/2024 44.4 37.5 - 51.0 % Final     Platelets   Date Value Ref Range Status   04/08/2024 246 150 - 450 x10E3/uL Final     Triglycerides   Date Value Ref Range Status   04/08/2024 92 0 - 149 mg/dL Final   03/14/2023 74 <=149 mg/dL Final     HDL Cholesterol   Date Value Ref Range Status   04/08/2024 77 >39 mg/dL  Final   03/14/2023 69 >=40 mg/dL Final     Comment:     Levels of HDL cholesterol above 60 mg/dL are considered a negative risk factor for coronary heart disease.   Source: NCEP ATP III Expert report, LELAND 2001; 285(19):2486-97; Circulation, 2002;106:3143.     LDL Cholesterol    Date Value Ref Range Status   03/14/2023 134 (H) 1 - 129 mg/dL Final     Comment:     ATP III Classification of LDL Cholesterol          Optimal        (<100)      mg/dL          Near Optimal   (100-129)   mg/dL          Borderline High(130-159)   mg/dL          High           (160-189)   mg/dL          Very High      (>189)      mg/dL     LDL Chol Calc (NIH)   Date Value Ref Range Status   04/08/2024 154 (H) 0 - 99 mg/dL Final         Assessment / Plan     Assessment/Plan:    1. Acute cough  - POCT SARS-CoV-2 Antigen REHAN + Flu    2. Acute intractable headache, unspecified headache type  - POCT SARS-CoV-2 Antigen REHAN + Flu    3. URI with cough and congestion  - amoxicillin-clavulanate (AUGMENTIN) 875-125 MG per tablet; Take 1 tablet by mouth 2 (Two) Times a Day for 7 days.  Dispense: 14 tablet; Refill: 0    Diagnoses and all orders for this visit:    1. Acute cough (Primary)  -     POCT SARS-CoV-2 Antigen REHAN + Flu    2. Acute intractable headache, unspecified headache type  -     POCT SARS-CoV-2 Antigen REHAN + Flu    3. URI with cough and congestion  -     amoxicillin-clavulanate (AUGMENTIN) 875-125 MG per tablet; Take 1 tablet by mouth 2 (Two) Times a Day for 7 days.  Dispense: 14 tablet; Refill: 0  -     benzonatate (Tessalon Perles) 100 MG capsule; Take 1 capsule by mouth 3 (Three) Times a Day As Needed for Cough.  Dispense: 60 capsule; Refill: 0        Assessment & Plan      No follow-ups on file. unless patient needs to be seen sooner or acute issues arise.      I have discussed the patient results/orders and and plan/recommendation with them at today's visit.    Patient or patient representative verbalized consent for the use of  Ambient Listening during the visit with  ELSY Treadwell for chart documentation. 10/11/2024  09:09 CDT  ELSY Treadwell   10/11/2024

## 2024-11-08 RX ORDER — ONDANSETRON 4 MG/1
4 TABLET, ORALLY DISINTEGRATING ORAL EVERY 8 HOURS PRN
Qty: 30 TABLET | Refills: 0 | Status: SHIPPED | OUTPATIENT
Start: 2024-11-08

## 2024-11-08 RX ORDER — DOXYCYCLINE 100 MG/1
100 CAPSULE ORAL 2 TIMES DAILY
Qty: 20 CAPSULE | Refills: 0 | Status: SHIPPED | OUTPATIENT
Start: 2024-11-08

## 2025-01-01 DIAGNOSIS — G50.0 TRIGEMINAL NEURALGIA: ICD-10-CM

## 2025-01-02 RX ORDER — LAMOTRIGINE 25 MG/1
50 TABLET ORAL 2 TIMES DAILY
Qty: 360 TABLET | Refills: 1 | Status: SHIPPED | OUTPATIENT
Start: 2025-01-02